# Patient Record
Sex: MALE | Race: WHITE | Employment: OTHER | URBAN - METROPOLITAN AREA
[De-identification: names, ages, dates, MRNs, and addresses within clinical notes are randomized per-mention and may not be internally consistent; named-entity substitution may affect disease eponyms.]

---

## 2018-03-14 ENCOUNTER — IP HISTORICAL/CONVERTED ENCOUNTER (OUTPATIENT)
Dept: OTHER | Age: 83
End: 2018-03-14

## 2018-03-23 ENCOUNTER — DOCUMENTATION ONLY (OUTPATIENT)
Dept: CARDIOLOGY CLINIC | Age: 83
End: 2018-03-23

## 2018-03-23 NOTE — PROGRESS NOTES
Dual chamber pacemaker implantation performed 3/16/18 at [facility]     Wound check 1 week   Clinic visit 4 weeks         Cardiac Electrophysiology Report       PATIENT INFORMATION     Patient Name: Angeles Daley MRN: [id number]                Study Date: 3/16/2018     YOB: 1934 Age: 80 years         Gender: Male     Procedure: Radha Black     Referring Physician:  Dr. Gregorio Vicente     Duty   Name   Electrophysiologist   Lora Salinas MD               PATIENT HISTORY     80year old male with history of CAD/CABG, diabetes mellitus, hypertension transferred from [facility] due to symptomatic bradycardia. He presented there with chest pain for which he was eavluated for ischemia. Echocardiogram demonstrated preserved LV function. He was however noted to have sinus bradycardia with second degree AV block with questionable wandering atrial pacemaker rhythm. He reports previous episodes of syncope as well as recent fatigue/dyspnea on exertion. He is not rate lowering medications currently and is thus referred for implantation of a dual chamber pacemaker. He was suspected to have a possible early left lower lobe pulmonary infiltrate has nearly completed a prophylactic course of levaquin. He is without fevers or leukocytosis currently. PROCEDURE     The patient was brought to the Cardiac Electrophysiology laboratory in a post-absorptive, fasting state.  Informed consent was obtained. A peripheral IV was in place.  Continuous electrocardiographic, blood pressure, O2 saturation and  CO2 monitoring was initiated.  Intravenous antibiotics were administered pre-operatively. Self-adhesive cardioversion patches were positioned on the chest.  Conscious sedation was effectuated according to protocol. The patient was then prepped and draped in the usual sterile fashion.  A left subclavian venogram was performed and demonstrated patency of the vein.  A 50/50 mixture of lidocaine (1%) with epinephrine and bupivicaine (0.5%) was utilized for local anesthesia. An incision was performed medial to the left delto-pectoral groove. Sharp and blunt dissection was carried down to the level of the pre-pectoralis fascia. Hemostasis was maintained with electrocautery. Extra-thoracic, subclavian venous access was obtained twice and sheaths were placed using the modified Seldinger technique. Permanent pace/sense leads were advanced under fluoroscopic guidance and positioned in the right atrium and ventricle where stable pacing and sensing characteristics were encountered. The sheaths were peeled away and the leads were anchored to the pre-pectoralis fascia using O-ethibond non-absorbable suture material. A device pocket was then fashioned and flushed copiously with antibiotic solution. A pacemaker generator was connected to the leads and the generator was placed into the pocket. The device was secured to the pocket using O-ethibond, non-absorbable suture material. The pocket was then closed in three layers using 2-O vicryl, 3-O monocryl, 4-O monocryl absorbable suture material and Dermabond. The skin was closed using a sub-cuticular technique. A bio-occlusive dressing were applied to the skin. The patient remained hemodynamically stable, tolerated the procedure well and was transferred in stable condition. There were no immediate complications encountered during the procedure.  There was minimal blood loss and no specimen were removed.         LEAD & GENERATOR DATA          Model #   Serial #   Generator   Medtronic   [id number]   [id number]   Atrial Lead   Medtronic   [id number]   [id number]   Ventricular Lead   Medtronic   [id number]   [id number]       PACE/SENSE DATA       Sensed Wave (mV)   Threshold (V)   Impedance (Ohms)   Atrium   1.6   1.0   532   Ventricle   12   0.75   532       FINAL PROGRAMMING     Mode   Lower Rate (ppm)   Upper Rate (ppm)   AAIR-DDDR   60   130       MEDICATION SUMMARY     Medication Route   Unit   Total   Gentamycin   IV   mg   80   Ancef   IV   grams   2                       RADIOLOGY SUMMARY       Total   Fluoro Time (minutes)    3.2     Dose Area Product (mGy)   47       CONCLUSIONS     1. Successful implantation of a dual-chamber pacemaker. 2. IV antibiotics x 24 hours for 3 doses followed by Keflex 500 mg po tid x 5 days. 3. Monitor overnight on telemetry. 4. CXR (PA & lateral) and device interrogation in AM.   5. Possible discharge in AM.   6. Wound check in EP clinic in 7 days. 7. Follow up in EP clinic in 1 month or earlier if necessary. 8. Follow up with Dr. Letitia Awan as scheduled.       Helen campbell, Dr. Lizz Goldsmith for involving me in the care of this extraordinarily pleasant male.        Alireza Thornton MD   Cardiac Electrophysiology     Signature Line   Electronically Signed on 03/20/2018 09:46 EDT   ________________________________________________   Wilda Rodriguez MD               Modified by: Wilda Rodriguez MD on 03/16/2018 11:41 EDT     Modified by: Wilda Rodriguez MD on 03/20/2018 09:46 EDT     Modified by: Wilda Rodriguez MD on 03/20/2018 09:46 EDT     Modified by: Wilda Rodriguez MD on 03/20/2018 09:46 EDT     Result type: Operative Report   Result date: March 16, 2018 11:22 EDT   Result status: Auth (Verified)   Performed by: Wilda Rodriguez MD on March 16, 2018 11:27 EDT   Verified by: Wilda Rodriguez MD on March 20, 2018 09:46 EDT   Encounter info: [id number], [address], Inpatient, 3/14/2018 - 3/17/2018   Mauricio Lua             Electronically signed by Giovanna Herrera LPN at 69/20/41 8140        Documentation Only on 3/21/2018              Detailed Report

## 2018-03-28 ENCOUNTER — OFFICE VISIT (OUTPATIENT)
Dept: CARDIOLOGY CLINIC | Age: 83
End: 2018-03-28

## 2018-03-28 VITALS
DIASTOLIC BLOOD PRESSURE: 62 MMHG | WEIGHT: 228.6 LBS | HEART RATE: 68 BPM | HEIGHT: 72 IN | OXYGEN SATURATION: 97 % | SYSTOLIC BLOOD PRESSURE: 110 MMHG | BODY MASS INDEX: 30.96 KG/M2 | RESPIRATION RATE: 16 BRPM

## 2018-03-28 DIAGNOSIS — Z51.89 VISIT FOR WOUND CHECK: ICD-10-CM

## 2018-03-28 DIAGNOSIS — Z95.0 PACEMAKER: Primary | ICD-10-CM

## 2018-03-28 RX ORDER — TRAZODONE HYDROCHLORIDE 150 MG/1
150 TABLET ORAL
COMMUNITY
Start: 2018-02-07

## 2018-03-28 RX ORDER — DIVALPROEX SODIUM 500 MG/1
500 TABLET, DELAYED RELEASE ORAL 2 TIMES DAILY
COMMUNITY
Start: 2018-02-07

## 2018-03-28 RX ORDER — INSULIN DETEMIR 100 [IU]/ML
30 INJECTION, SOLUTION SUBCUTANEOUS 2 TIMES DAILY
COMMUNITY
Start: 2018-03-08

## 2018-03-28 RX ORDER — LEVOTHYROXINE SODIUM 25 UG/1
25 TABLET ORAL
COMMUNITY
Start: 2018-01-15

## 2018-03-28 RX ORDER — ATORVASTATIN CALCIUM 40 MG/1
40 TABLET, FILM COATED ORAL DAILY
COMMUNITY
Start: 2018-02-07

## 2018-03-28 RX ORDER — AMLODIPINE BESYLATE 10 MG/1
5 TABLET ORAL DAILY
COMMUNITY
Start: 2018-01-28

## 2018-03-28 RX ORDER — LOSARTAN POTASSIUM 25 MG/1
25 TABLET ORAL DAILY
COMMUNITY
Start: 2018-02-22

## 2018-03-28 RX ORDER — ASPIRIN 81 MG/1
TABLET ORAL DAILY
COMMUNITY

## 2018-03-28 RX ORDER — CLOPIDOGREL BISULFATE 75 MG/1
TABLET ORAL
COMMUNITY

## 2018-03-28 RX ORDER — LORAZEPAM 0.5 MG/1
TABLET ORAL
COMMUNITY

## 2018-03-28 RX ORDER — METFORMIN HYDROCHLORIDE 850 MG/1
TABLET ORAL 2 TIMES DAILY WITH MEALS
COMMUNITY

## 2018-03-28 RX ORDER — GLIMEPIRIDE 2 MG/1
TABLET ORAL
COMMUNITY
End: 2020-01-01 | Stop reason: SDUPTHER

## 2018-03-28 NOTE — PROGRESS NOTES
Patient presents for wound check post-device implantation. The dressing was removed and the site was inspected. The site appeared to be well-healing without ecchymosis/tenderness/erythema. Denies pain, fevers, discharge. Plan:    Continue follow up in device clinic as planned.        Robby Rogers NP

## 2018-05-02 ENCOUNTER — CLINICAL SUPPORT (OUTPATIENT)
Dept: CARDIOLOGY CLINIC | Age: 83
End: 2018-05-02

## 2018-05-02 ENCOUNTER — OFFICE VISIT (OUTPATIENT)
Dept: CARDIOLOGY CLINIC | Age: 83
End: 2018-05-02

## 2018-05-02 VITALS
HEIGHT: 72 IN | RESPIRATION RATE: 18 BRPM | HEART RATE: 85 BPM | SYSTOLIC BLOOD PRESSURE: 114 MMHG | OXYGEN SATURATION: 96 % | WEIGHT: 227.6 LBS | BODY MASS INDEX: 30.83 KG/M2 | DIASTOLIC BLOOD PRESSURE: 64 MMHG

## 2018-05-02 DIAGNOSIS — Z95.0 PACEMAKER: ICD-10-CM

## 2018-05-02 DIAGNOSIS — I49.5 SSS (SICK SINUS SYNDROME) (HCC): ICD-10-CM

## 2018-05-02 DIAGNOSIS — I25.10 CORONARY ARTERY DISEASE INVOLVING NATIVE CORONARY ARTERY OF NATIVE HEART WITHOUT ANGINA PECTORIS: Primary | ICD-10-CM

## 2018-05-02 DIAGNOSIS — Z95.0 PACEMAKER: Primary | ICD-10-CM

## 2018-05-02 DIAGNOSIS — I10 ESSENTIAL HYPERTENSION: ICD-10-CM

## 2018-05-02 RX ORDER — PANTOPRAZOLE SODIUM 40 MG/1
40 TABLET, DELAYED RELEASE ORAL DAILY
COMMUNITY
End: 2020-01-01 | Stop reason: SDUPTHER

## 2018-05-02 NOTE — PROGRESS NOTES
HISTORY OF PRESENTING ILLNESS      Jesenia Joseph is a 80 y.o. male with history of CAD/CABG, diabetes mellitus, hypertension transferred from [facility] due to symptomatic bradycardia. He presented there with chest pain for which he was eavluated for ischemia. Echocardiogram demonstrated preserved LV function. He was however noted to have sinus bradycardia with second degree AV block with questionable wandering atrial pacemaker rhythm. He reports previous episodes of syncope as well as recent fatigue/dyspnea on exertion. He is not rate lowering medications currently and is thus referred for implantation of a dual chamber pacemaker. He was suspected to have a possible early left lower lobe pulmonary infiltrate has nearly completed a prophylactic course of levaquin. He is without fevers or leukocytosis currently. He underwent implantation of dual chamber pacemaker and now presents for follow up. Incision has healed well and device interrogation demonstrates normal functioning. ACTIVE PROBLEM LIST     There are no active problems to display for this patient. PAST MEDICAL HISTORY     No past medical history on file. PAST SURGICAL HISTORY     No past surgical history on file. ALLERGIES     No Known Allergies       FAMILY HISTORY     No family history on file. negative for cardiac disease       SOCIAL HISTORY     Social History     Social History    Marital status:      Spouse name: N/A    Number of children: N/A    Years of education: N/A     Social History Main Topics    Smoking status: Not on file    Smokeless tobacco: Not on file    Alcohol use Not on file    Drug use: Not on file    Sexual activity: Not on file     Other Topics Concern    Not on file     Social History Narrative         MEDICATIONS     Current Outpatient Prescriptions   Medication Sig    amLODIPine (NORVASC) 10 mg tablet Take 10 mg by mouth daily.     atorvastatin (LIPITOR) 40 mg tablet Take 40 mg by mouth daily.  aspirin delayed-release 81 mg tablet Take  by mouth daily.  clopidogrel (PLAVIX) 75 mg tab Take  by mouth.  divalproex DR (DEPAKOTE) 500 mg tablet Take 500 mg by mouth two (2) times a day.  LEVEMIR FLEXTOUCH U-100 INSULN 100 unit/mL (3 mL) inpn 20 Units by SubCUTAneous route nightly.  levothyroxine (SYNTHROID) 25 mcg tablet Take 25 mcg by mouth Daily (before breakfast).  traZODone (DESYREL) 150 mg tablet Take 150 mg by mouth nightly.  losartan (COZAAR) 25 mg tablet Take 25 mg by mouth daily.  ferrous sulfate 325 mg (65 mg iron) cpER Take  by mouth daily.  glimepiride (AMARYL) 2 mg tablet Take  by mouth every morning.  LORazepam (ATIVAN) 0.5 mg tablet Take  by mouth two (2) times daily as needed for Anxiety.  metFORMIN (GLUCOPHAGE) 850 mg tablet Take  by mouth two (2) times daily (with meals). No current facility-administered medications for this visit. I have reviewed the nurses notes, vitals, problem list, allergy list, medical history, family, social history and medications. REVIEW OF SYMPTOMS      General: Pt denies excessive weight gain or loss. Pt is able to conduct ADL's  HEENT: Denies blurred vision, headaches, hearing loss, epistaxis and difficulty swallowing. Respiratory: Denies cough, congestion, shortness of breath, WRIGHT, wheezing or stridor. Cardiovascular: Denies precordial pain, palpitations, edema or PND  Gastrointestinal: Denies poor appetite, indigestion, abdominal pain or blood in stool  Genitourinary: Denies hematuria, dysuria, increased urinary frequency  Musculoskeletal: Denies joint pain or swelling from muscles or joints  Neurologic: Denies tremor, paresthesias, headache, or sensory motor disturbance  Psychiatric: Denies confusion, insomnia, depression  Integumentray: Denies rash, itching or ulcers. Hematologic: Denies easy bruising, bleeding       PHYSICAL EXAMINATION      There were no vitals filed for this visit.   General: Well developed, in no acute distress. HEENT: No jaundice, oral mucosa moist, no oral ulcers  Neck: Supple, no stiffness, no lymphadenopathy, supple  Heart:  Normal S1/S2 negative S3 or S4. Regular, no murmur, gallop or rub, no jugular venous distention  Respiratory: Clear bilaterally x 4, no wheezing or rales  Abdomen:   Soft, non-tender, bowel sounds are active.   Extremities:  No edema, normal cap refill, no cyanosis. Musculoskeletal: No clubbing, no deformities  Neuro: A&Ox3, speech clear, gait stable, cooperative, no focal neurologic deficits  Skin: Skin color is normal. No rashes or lesions. Non diaphoretic, moist.  Vascular: 2+ pulses symmetric in all extremities       DIAGNOSTIC DATA      EKG: atrial pacing       LABORATORY DATA    No results found for: WBC, HGBPOC, HGB, HGBP, HCTPOC, HCT, PHCT, RBCH, PLT, MCV, HGBEXT, HCTEXT, PLTEXT   No results found for: NA, K, CL, CO2, AGAP, GLU, BUN, CREA, BUCR, GFRAA, GFRNA, CA, TBIL, TBILI, GPT, SGOT, AP, TP, ALB, GLOB, AGRAT, ALT        ASSESSMENT      1. Symptomatic Bradycardia   Dual Chamber MDT pacemaker  2. Hypertension  3. CAD  4. Diabetes Mellitus     PLAN     Continue per device clinic and with current medical therapy. FOLLOW-UP   1 year    Thank you, Dr. Morgan Romano for allowing me to participate in the care of this extraordinarily pleasant male. Please do not hesitate to contact me for further questions/concerns.      Rachael Harp, NP      [address] [facility] [address]  [address], [facility], [address]  [phone] / [phone] Fax   [phone] / [phone] Fax

## 2018-07-09 ENCOUNTER — TELEPHONE (OUTPATIENT)
Dept: CARDIOLOGY CLINIC | Age: 83
End: 2018-07-09

## 2018-07-09 NOTE — TELEPHONE ENCOUNTER
Pt step daughter Nicole Fontenot called requesting a sooner appt for pt due to frequent SVT. Nicole Fontenot can be reached at #[phone] or #[phone].   Thanks

## 2018-07-13 NOTE — TELEPHONE ENCOUNTER
Patients daughter called called again to get a sooner appt due to some concerns from the readings on his pacer   Phone: [phone]

## 2018-07-13 NOTE — TELEPHONE ENCOUNTER
Scheduled for 7/18 @ 3pm.  For Beata and pacer. Requested notes from Dr. Verna Boeck. Frequent episodes of SVT on pacer check. Medications adjusted per Dr. Verna Boeck on 7/5.

## 2018-07-18 ENCOUNTER — CLINICAL SUPPORT (OUTPATIENT)
Dept: CARDIOLOGY CLINIC | Age: 83
End: 2018-07-18

## 2018-07-18 ENCOUNTER — OFFICE VISIT (OUTPATIENT)
Dept: CARDIOLOGY CLINIC | Age: 83
End: 2018-07-18

## 2018-07-18 VITALS
DIASTOLIC BLOOD PRESSURE: 60 MMHG | HEART RATE: 65 BPM | RESPIRATION RATE: 24 BRPM | HEIGHT: 72 IN | SYSTOLIC BLOOD PRESSURE: 108 MMHG | OXYGEN SATURATION: 98 %

## 2018-07-18 DIAGNOSIS — Z95.0 CARDIAC PACEMAKER IN SITU: Primary | ICD-10-CM

## 2018-07-18 DIAGNOSIS — R06.02 SHORTNESS OF BREATH: ICD-10-CM

## 2018-07-18 DIAGNOSIS — Z95.0 PACEMAKER: Primary | ICD-10-CM

## 2018-07-18 RX ORDER — VALPROIC ACID 250 MG/1
500 CAPSULE, LIQUID FILLED ORAL 2 TIMES DAILY
COMMUNITY

## 2018-07-18 RX ORDER — METOPROLOL SUCCINATE 50 MG/1
TABLET, EXTENDED RELEASE ORAL DAILY
COMMUNITY

## 2018-07-18 NOTE — PROGRESS NOTES
HISTORY OF PRESENTING ILLNESS Dixon Monte is a 80 y.o. male with  history of CAD/CABG, diabetes mellitus, hypertension transferred from [facility] due to symptomatic bradycardia. He presented there with chest pain for which he was eavluated for ischemia. Echocardiogram demonstrated preserved LV function. He was however noted to have sinus bradycardia with second degree AV block with questionable wandering atrial pacemaker rhythm. He reports previous episodes of syncope as well as recent fatigue/dyspnea on exertion. He is not rate lowering medications currently and is thus referred for implantation of a dual chamber pacemaker. He was suspected to have a possible early left lower lobe pulmonary infiltrate. He presents today after being started on metoprolol for possible SVT. Device interrogation reveals normal functioning without recurrent SVT. He has been experiencing significant WRIGHT however reports this is episodic and chronic. Limited bedside echocardiogram today failed to demonstrate pericardial effusion. ACTIVE PROBLEM LIST There are no active problems to display for this patient. PAST MEDICAL HISTORY No past medical history on file. PAST SURGICAL HISTORY No past surgical history on file. ALLERGIES No Known Allergies FAMILY HISTORY No family history on file. negative for cardiac disease SOCIAL HISTORY Social History Social History  Marital status:  Spouse name: N/A  
 Number of children: N/A  
 Years of education: N/A Social History Main Topics  Smoking status: Former Smoker  Smokeless tobacco: Never Used  Alcohol use No  
 Drug use: None  Sexual activity: Not Asked Other Topics Concern  None Social History Narrative MEDICATIONS Current Outpatient Prescriptions Medication Sig  
 valproic acid (DEPAKENE) 250 mg capsule Take 500 mg by mouth two (2) times a day.   
 metoprolol succinate (TOPROL-XL) 50 mg XL tablet Take  by mouth daily.  pantoprazole (PROTONIX) 40 mg tablet Take 40 mg by mouth daily.  amLODIPine (NORVASC) 10 mg tablet Take 5 mg by mouth daily.  atorvastatin (LIPITOR) 40 mg tablet Take 40 mg by mouth daily.  aspirin delayed-release 81 mg tablet Take  by mouth daily.  clopidogrel (PLAVIX) 75 mg tab Take  by mouth.  LEVEMIR FLEXTOUCH U-100 INSULN 100 unit/mL (3 mL) inpn 30 Units by SubCUTAneous route two (2) times a day.  levothyroxine (SYNTHROID) 25 mcg tablet Take 25 mcg by mouth Daily (before breakfast).  traZODone (DESYREL) 150 mg tablet Take 150 mg by mouth nightly.  losartan (COZAAR) 25 mg tablet Take 25 mg by mouth daily.  ferrous sulfate 325 mg (65 mg iron) cpER Take  by mouth daily.  glimepiride (AMARYL) 2 mg tablet Take  by mouth every morning.  metFORMIN (GLUCOPHAGE) 850 mg tablet Take  by mouth two (2) times daily (with meals).  divalproex DR (DEPAKOTE) 500 mg tablet Take 500 mg by mouth two (2) times a day.  LORazepam (ATIVAN) 0.5 mg tablet Take  by mouth two (2) times daily as needed for Anxiety. No current facility-administered medications for this visit. I have reviewed the nurses notes, vitals, problem list, allergy list, medical history, family, social history and medications. REVIEW OF SYMPTOMS General: Pt denies excessive weight gain or loss. Pt is able to conduct ADL's HEENT: Denies blurred vision, headaches, hearing loss, epistaxis and difficulty swallowing. Respiratory: Denies cough, congestion, shortness of breath, WRIGHT, wheezing or stridor. Cardiovascular: Denies precordial pain, palpitations, edema or PND Gastrointestinal: Denies poor appetite, indigestion, abdominal pain or blood in stool Genitourinary: Denies hematuria, dysuria, increased urinary frequency Musculoskeletal: Denies joint pain or swelling from muscles or joints Neurologic: Denies tremor, paresthesias, headache, or sensory motor disturbance Psychiatric: Denies confusion, insomnia, depression Integumentray: Denies rash, itching or ulcers. Hematologic: Denies easy bruising, bleeding PHYSICAL EXAMINATION Vitals:  
 07/18/18 1520 BP: 108/60 Pulse: 65 Resp: 24 SpO2: 98% Height: 6' (1.829 m) General: Well developed, in no acute distress. HEENT: No jaundice, oral mucosa moist, no oral ulcers Neck: Supple, no stiffness, no lymphadenopathy, supple Heart:  Normal S1/S2 negative S3 or S4. Regular, no murmur, gallop or rub, no jugular venous distention Respiratory: Clear bilaterally x 4, no wheezing or rales Abdomen:   Soft, non-tender, bowel sounds are active.  
Extremities:  No edema, normal cap refill, no cyanosis. Musculoskeletal: No clubbing, no deformities Neuro: A&Ox3, speech clear, gait stable, cooperative, no focal neurologic deficits Skin: Skin color is normal. No rashes or lesions. Non diaphoretic, moist. 
Vascular: 2+ pulses symmetric in all extremities DIAGNOSTIC DATA EKG:  
 
 
 LABORATORY DATA No results found for: WBC, HGBPOC, HGB, HGBP, HCTPOC, HCT, PHCT, RBCH, PLT, MCV, HGBEXT, HCTEXT, PLTEXT No results found for: NA, K, CL, CO2, AGAP, GLU, BUN, CREA, BUCR, GFRAA, GFRNA, CA, TBIL, TBILI, GPT, SGOT, AP, TP, ALB, GLOB, AGRAT, ALT  
 
 
 ASSESSMENT 1. Symptomatic Bradycardia Dual Chamber MDT pacemaker 2. Hypertension 3. CAD 4. Diabetes Mellitus PLAN Continue current medications and device clinic follow up. No changes at this time. FOLLOW-UP  
 
1 year Thank you, Dr. Ping Fox for allowing me to participate in the care of this extraordinarily pleasant male. Please do not hesitate to contact me for further questions/concerns. Last Samaniego MD 
Cardiac Electrophysiology / Cardiology [address] [place] [address], [address]   [address] [facility], [address] [place], [facility] [address]    [place] [facility] 
[phone] / [phone] Fax   [phone] / [phone] Fax

## 2019-11-18 ENCOUNTER — OP HISTORICAL/CONVERTED ENCOUNTER (OUTPATIENT)
Dept: OTHER | Age: 84
End: 2019-11-18

## 2020-01-01 ENCOUNTER — TELEPHONE (OUTPATIENT)
Dept: PRIMARY CARE CLINIC | Age: 85
End: 2020-01-01

## 2020-01-01 ENCOUNTER — OFFICE VISIT (OUTPATIENT)
Dept: PRIMARY CARE CLINIC | Age: 85
End: 2020-01-01
Payer: MEDICARE

## 2020-01-01 ENCOUNTER — APPOINTMENT (OUTPATIENT)
Dept: GENERAL RADIOLOGY | Age: 85
End: 2020-01-01
Attending: EMERGENCY MEDICINE
Payer: MEDICARE

## 2020-01-01 ENCOUNTER — APPOINTMENT (OUTPATIENT)
Dept: CT IMAGING | Age: 85
End: 2020-01-01
Attending: EMERGENCY MEDICINE
Payer: MEDICARE

## 2020-01-01 ENCOUNTER — HOSPITAL ENCOUNTER (EMERGENCY)
Age: 85
Discharge: PSYCHIATRIC OR SUBSTANCE USE FACILITY | End: 2020-11-28
Attending: EMERGENCY MEDICINE
Payer: MEDICARE

## 2020-01-01 ENCOUNTER — OP HISTORICAL/CONVERTED ENCOUNTER (OUTPATIENT)
Dept: OTHER | Age: 85
End: 2020-01-01

## 2020-01-01 ENCOUNTER — HOSPITAL ENCOUNTER (EMERGENCY)
Age: 85
Discharge: HOME | End: 2020-10-15
Attending: EMERGENCY MEDICINE
Payer: MEDICARE

## 2020-01-01 ENCOUNTER — TELEPHONE (OUTPATIENT)
Dept: BEHAVIORAL/MENTAL HEALTH CLINIC | Age: 85
End: 2020-01-01

## 2020-01-01 VITALS
WEIGHT: 224 LBS | HEIGHT: 72 IN | BODY MASS INDEX: 30.34 KG/M2 | SYSTOLIC BLOOD PRESSURE: 135 MMHG | HEART RATE: 69 BPM | OXYGEN SATURATION: 98 % | TEMPERATURE: 97.3 F | DIASTOLIC BLOOD PRESSURE: 72 MMHG | RESPIRATION RATE: 20 BRPM

## 2020-01-01 VITALS
OXYGEN SATURATION: 99 % | TEMPERATURE: 98.9 F | HEART RATE: 73 BPM | SYSTOLIC BLOOD PRESSURE: 189 MMHG | DIASTOLIC BLOOD PRESSURE: 76 MMHG | RESPIRATION RATE: 16 BRPM

## 2020-01-01 VITALS
HEART RATE: 61 BPM | RESPIRATION RATE: 18 BRPM | DIASTOLIC BLOOD PRESSURE: 83 MMHG | TEMPERATURE: 96.8 F | OXYGEN SATURATION: 97 % | SYSTOLIC BLOOD PRESSURE: 172 MMHG

## 2020-01-01 DIAGNOSIS — Z23 NEEDS FLU SHOT: Primary | ICD-10-CM

## 2020-01-01 DIAGNOSIS — Z02.2 ENCOUNTER FOR EXAMINATION FOR ADMISSION TO NURSING HOME: Primary | ICD-10-CM

## 2020-01-01 DIAGNOSIS — F03.91 DEMENTIA WITH BEHAVIORAL DISTURBANCE, UNSPECIFIED DEMENTIA TYPE: Primary | ICD-10-CM

## 2020-01-01 DIAGNOSIS — E11.9 TYPE 2 DIABETES MELLITUS WITHOUT COMPLICATION, WITHOUT LONG-TERM CURRENT USE OF INSULIN (HCC): ICD-10-CM

## 2020-01-01 DIAGNOSIS — I25.10 CORONARY ARTERY DISEASE INVOLVING NATIVE CORONARY ARTERY OF NATIVE HEART WITHOUT ANGINA PECTORIS: ICD-10-CM

## 2020-01-01 DIAGNOSIS — F41.9 ANXIETY: ICD-10-CM

## 2020-01-01 DIAGNOSIS — S00.81XA ABRASION OF FACE, INITIAL ENCOUNTER: ICD-10-CM

## 2020-01-01 DIAGNOSIS — F03.91 DEMENTIA WITH BEHAVIORAL DISTURBANCE, UNSPECIFIED DEMENTIA TYPE: ICD-10-CM

## 2020-01-01 DIAGNOSIS — E11.9 CONTROLLED TYPE 2 DIABETES MELLITUS WITHOUT COMPLICATION, WITHOUT LONG-TERM CURRENT USE OF INSULIN (HCC): Primary | ICD-10-CM

## 2020-01-01 DIAGNOSIS — R52 PAIN: ICD-10-CM

## 2020-01-01 DIAGNOSIS — F03.911 AGITATION DUE TO DEMENTIA: ICD-10-CM

## 2020-01-01 DIAGNOSIS — K21.9 GASTROESOPHAGEAL REFLUX DISEASE WITHOUT ESOPHAGITIS: ICD-10-CM

## 2020-01-01 DIAGNOSIS — F99 INSOMNIA DUE TO OTHER MENTAL DISORDER: ICD-10-CM

## 2020-01-01 DIAGNOSIS — R05.9 COUGH IN ADULT: Primary | ICD-10-CM

## 2020-01-01 DIAGNOSIS — I25.10 CORONARY ARTERIOSCLEROSIS: ICD-10-CM

## 2020-01-01 DIAGNOSIS — I49.5 SICK SINUS SYNDROME (HCC): ICD-10-CM

## 2020-01-01 DIAGNOSIS — I10 ESSENTIAL HYPERTENSION: ICD-10-CM

## 2020-01-01 DIAGNOSIS — I50.9 CONGESTIVE HEART FAILURE, UNSPECIFIED HF CHRONICITY, UNSPECIFIED HEART FAILURE TYPE (HCC): ICD-10-CM

## 2020-01-01 DIAGNOSIS — Z51.81 MEDICATION MONITORING ENCOUNTER: Primary | ICD-10-CM

## 2020-01-01 DIAGNOSIS — F51.05 INSOMNIA DUE TO OTHER MENTAL DISORDER: ICD-10-CM

## 2020-01-01 DIAGNOSIS — S22.32XA CLOSED FRACTURE OF ONE RIB OF LEFT SIDE, INITIAL ENCOUNTER: ICD-10-CM
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
## 2020-01-01 DIAGNOSIS — R41.82 ALTERED MENTAL STATUS, UNSPECIFIED ALTERED MENTAL STATUS TYPE: Primary | ICD-10-CM

## 2020-01-01 LAB
ALBUMIN SERPL-MCNC: 3.5 G/DL (ref 3.5–5)
ALBUMIN SERPL-MCNC: 4.2 G/DL (ref 3.6–4.6)
ALBUMIN/CREAT UR: 42 MG/G CREAT (ref 0–29)
ALBUMIN/GLOB SERPL: 1 {RATIO} (ref 1.1–2.2)
ALBUMIN/GLOB SERPL: 2 {RATIO} (ref 1.2–2.2)
ALP SERPL-CCNC: 61 IU/L (ref 39–117)
ALP SERPL-CCNC: 71 U/L (ref 45–117)
ALT SERPL-CCNC: 16 U/L (ref 12–78)
ALT SERPL-CCNC: 9 IU/L (ref 0–44)
AMPHET UR QL SCN: NEGATIVE
ANION GAP SERPL CALC-SCNC: 13 MMOL/L (ref 5–15)
APAP SERPL-MCNC: 10 UG/ML (ref 10–30)
APPEARANCE UR: CLEAR
APPEARANCE UR: CLEAR
AST SERPL W P-5'-P-CCNC: 14 U/L (ref 15–37)
AST SERPL-CCNC: 13 IU/L (ref 0–40)
ATRIAL RATE: 238 BPM
BACTERIA #/AREA URNS HPF: ABNORMAL /[HPF]
BACTERIA URNS QL MICRO: NEGATIVE /HPF
BARBITURATES UR QL SCN: NEGATIVE
BASOPHILS # BLD AUTO: 0 X10E3/UL (ref 0–0.2)
BASOPHILS # BLD: 0 K/UL (ref 0–0.2)
BASOPHILS NFR BLD AUTO: 0 %
BASOPHILS NFR BLD: 1 % (ref 0–2.5)
BENZODIAZ UR QL: NEGATIVE
BILIRUB SERPL-MCNC: 0.4 MG/DL (ref 0–1.2)
BILIRUB SERPL-MCNC: 0.6 MG/DL (ref 0.2–1)
BILIRUB UR QL STRIP: NEGATIVE
BILIRUB UR QL: NEGATIVE
BUN SERPL-MCNC: 13 MG/DL (ref 6–20)
BUN SERPL-MCNC: 14 MG/DL (ref 8–27)
BUN/CREAT SERPL: 13 (ref 10–24)
BUN/CREAT SERPL: 14 (ref 12–20)
CA-I BLD-MCNC: 9.1 MG/DL (ref 8.5–10.1)
CALCIUM SERPL-MCNC: 9.4 MG/DL (ref 8.6–10.2)
CALCULATED R AXIS, ECG10: -72 DEGREES
CALCULATED T AXIS, ECG11: -160 DEGREES
CANNABINOIDS UR QL SCN: NEGATIVE
CASTS URNS MICRO: ABNORMAL
CASTS URNS QL MICRO: PRESENT /LPF
CHLORIDE SERPL-SCNC: 101 MMOL/L (ref 96–106)
CHLORIDE SERPL-SCNC: 103 MMOL/L (ref 97–108)
CO2 SERPL-SCNC: 26 MMOL/L (ref 20–29)
CO2 SERPL-SCNC: 27 MMOL/L (ref 21–32)
COCAINE UR QL SCN: NEGATIVE
COLOR UR: ABNORMAL
COLOR UR: YELLOW
CREAT SERPL-MCNC: 0.96 MG/DL (ref 0.7–1.3)
CREAT SERPL-MCNC: 1.1 MG/DL (ref 0.76–1.27)
CREAT UR-MCNC: 39.8 MG/DL
DATE LAST DOSE: ABNORMAL
DATE LAST DOSE: NORMAL
DIAGNOSIS, 93000: NORMAL
DRUG SCRN COMMENT,DRGCM: NORMAL
ECSTASY, ECST: NEGATIVE
EOSINOPHIL # BLD AUTO: 0.2 X10E3/UL (ref 0–0.4)
EOSINOPHIL # BLD: 0.1 K/UL (ref 0–0.7)
EOSINOPHIL NFR BLD AUTO: 2 %
EOSINOPHIL NFR BLD: 1 % (ref 0.9–2.9)
EPI CELLS #/AREA URNS HPF: ABNORMAL /HPF (ref 0–10)
EPITH CASTS URNS QL MICRO: NORMAL /LPF
ERYTHROCYTE [DISTWIDTH] IN BLOOD BY AUTOMATED COUNT: 13.5 % (ref 11.5–14.5)
ERYTHROCYTE [DISTWIDTH] IN BLOOD BY AUTOMATED COUNT: 13.7 % (ref 11.6–15.4)
EST. AVERAGE GLUCOSE BLD GHB EST-MCNC: 140 MG/DL
ETHANOL SERPL-MCNC: <4 MG/DL
GLOBULIN SER CALC-MCNC: 2.1 G/DL (ref 1.5–4.5)
GLOBULIN SER CALC-MCNC: 3.5 G/DL (ref 2–4)
GLUCOSE BLD STRIP.AUTO-MCNC: 245 MG/DL (ref 65–100)
GLUCOSE SERPL-MCNC: 114 MG/DL (ref 65–100)
GLUCOSE SERPL-MCNC: 149 MG/DL (ref 65–99)
GLUCOSE UR QL: ABNORMAL
GLUCOSE UR STRIP.AUTO-MCNC: NEGATIVE MG/DL
HBA1C MFR BLD: 6.5 % (ref 4.8–5.6)
HCT VFR BLD AUTO: 41.5 % (ref 41–53)
HCT VFR BLD AUTO: 41.6 % (ref 37.5–51)
HGB BLD-MCNC: 13.7 G/DL (ref 13.5–17.5)
HGB BLD-MCNC: 14 G/DL (ref 13–17.7)
HGB UR QL STRIP: NEGATIVE
HGB UR QL STRIP: NEGATIVE
IMM GRANULOCYTES # BLD AUTO: 0 X10E3/UL (ref 0–0.1)
IMM GRANULOCYTES NFR BLD AUTO: 1 %
KETONES UR QL STRIP.AUTO: ABNORMAL MG/DL
KETONES UR QL STRIP: NEGATIVE
LEUKOCYTE ESTERASE UR QL STRIP.AUTO: ABNORMAL
LEUKOCYTE ESTERASE UR QL STRIP: ABNORMAL
LYMPHOCYTES # BLD AUTO: 2.5 X10E3/UL (ref 0.7–3.1)
LYMPHOCYTES # BLD: 1.4 K/UL (ref 1–4.8)
LYMPHOCYTES NFR BLD AUTO: 29 %
LYMPHOCYTES NFR BLD: 21 % (ref 20.5–51.1)
MAGNESIUM SERPL-MCNC: 1.8 MG/DL (ref 1.6–2.4)
MCH RBC QN AUTO: 29.7 PG (ref 31–34)
MCH RBC QN AUTO: 30.5 PG (ref 26.6–33)
MCHC RBC AUTO-ENTMCNC: 32.9 G/DL (ref 31–36)
MCHC RBC AUTO-ENTMCNC: 33.7 G/DL (ref 31.5–35.7)
MCV RBC AUTO: 90.3 FL (ref 80–100)
MCV RBC AUTO: 91 FL (ref 79–97)
METHADONE UR QL: NEGATIVE
MICRO URNS: ABNORMAL
MICROALBUMIN UR-MCNC: 16.6 UG/ML
MONOCYTES # BLD AUTO: 1 X10E3/UL (ref 0.1–0.9)
MONOCYTES # BLD: 0.9 K/UL (ref 0.2–2.4)
MONOCYTES NFR BLD AUTO: 12 %
MONOCYTES NFR BLD: 13 % (ref 1.7–9.3)
MORPHOLOGY BLD-IMP: ABNORMAL
MUCOUS THREADS URNS QL MICRO: PRESENT
NEUTROPHILS # BLD AUTO: 4.7 X10E3/UL (ref 1.4–7)
NEUTROPHILS NFR BLD AUTO: 56 %
NEUTS SEG # BLD: 4.4 K/UL (ref 1.8–7.7)
NEUTS SEG NFR BLD: 64 % (ref 42–75)
NITRITE UR QL STRIP.AUTO: NEGATIVE
NITRITE UR QL STRIP: NEGATIVE
NRBC # BLD: 0 K/UL
NRBC BLD-RTO: 0 PER 100 WBC
OPIATES UR QL: NEGATIVE
P-R INTERVAL, ECG05: 212 MS
PCP UR QL: NEGATIVE
PERFORMED BY, TECHID: ABNORMAL
PH UR STRIP: 6.5 [PH] (ref 5–7.5)
PH UR STRIP: 6.5 [PH] (ref 5–8)
PLATELET # BLD AUTO: 107 K/UL
PLATELET # BLD AUTO: 115 X10E3/UL (ref 150–450)
PMV BLD AUTO: 12 FL (ref 6.5–11.5)
POTASSIUM SERPL-SCNC: 3.8 MMOL/L (ref 3.5–5.1)
POTASSIUM SERPL-SCNC: 5.1 MMOL/L (ref 3.5–5.2)
PROT SERPL-MCNC: 6.3 G/DL (ref 6–8.5)
PROT SERPL-MCNC: 7 G/DL (ref 6.4–8.2)
PROT UR QL STRIP: NEGATIVE
PROT UR STRIP-MCNC: NEGATIVE MG/DL
Q-T INTERVAL, ECG07: 503 MS
QRS DURATION, ECG06: 172 MS
QTC CALCULATION (BEZET), ECG08: 516 MS
RBC # BLD AUTO: 4.59 X10E6/UL (ref 4.14–5.8)
RBC # BLD AUTO: 4.6 M/UL (ref 4.5–5.9)
RBC #/AREA URNS HPF: ABNORMAL /HPF (ref 0–2)
RBC #/AREA URNS HPF: NORMAL /HPF (ref 0–3)
REPORTED DOSE,DOSE: ABNORMAL UNITS
REPORTED DOSE,DOSE: NORMAL UNITS
SALICYLATES SERPL-MCNC: <1.7 MG/DL (ref 2.8–20)
SARS-COV-2, COV2: NORMAL
SARS-COV-2, COV2: NOT DETECTED
SODIUM SERPL-SCNC: 140 MMOL/L (ref 134–144)
SODIUM SERPL-SCNC: 143 MMOL/L (ref 136–145)
SP GR UR REFRACTOMETRY: 1.02 (ref 1–1.03)
SP GR UR: 1.01 (ref 1–1.03)
SPECIMEN SOURCE: NORMAL
TROPONIN I SERPL-MCNC: <0.05 NG/ML
TROPONIN I SERPL-MCNC: <0.05 NG/ML
TSH SERPL DL<=0.05 MIU/L-ACNC: 2.5 UIU/ML (ref 0.36–3.74)
UROBILINOGEN UR QL STRIP.AUTO: 4 EU/DL (ref 0.2–1)
UROBILINOGEN UR STRIP-MCNC: 0.2 MG/DL (ref 0.2–1)
VENTRICULAR RATE, ECG03: 63 BPM
WBC # BLD AUTO: 6.8 K/UL (ref 4.4–11.3)
WBC # BLD AUTO: 8.5 X10E3/UL (ref 3.4–10.8)
WBC #/AREA URNS HPF: ABNORMAL /HPF (ref 0–5)
WBC URNS QL MICRO: NORMAL /HPF (ref 0–5)

## 2020-01-01 PROCEDURE — 74011250637 HC RX REV CODE- 250/637: Performed by: EMERGENCY MEDICINE

## 2020-01-01 PROCEDURE — 96372 THER/PROPH/DIAG INJ SC/IM: CPT

## 2020-01-01 PROCEDURE — 83735 ASSAY OF MAGNESIUM: CPT

## 2020-01-01 PROCEDURE — G8432 DEP SCR NOT DOC, RNG: HCPCS | Performed by: NURSE PRACTITIONER

## 2020-01-01 PROCEDURE — 74011250636 HC RX REV CODE- 250/636: Performed by: EMERGENCY MEDICINE

## 2020-01-01 PROCEDURE — 87635 SARS-COV-2 COVID-19 AMP PRB: CPT

## 2020-01-01 PROCEDURE — 85025 COMPLETE CBC W/AUTO DIFF WBC: CPT

## 2020-01-01 PROCEDURE — 71101 X-RAY EXAM UNILAT RIBS/CHEST: CPT

## 2020-01-01 PROCEDURE — 81001 URINALYSIS AUTO W/SCOPE: CPT

## 2020-01-01 PROCEDURE — 84443 ASSAY THYROID STIM HORMONE: CPT

## 2020-01-01 PROCEDURE — 74011250637 HC RX REV CODE- 250/637

## 2020-01-01 PROCEDURE — 99284 EMERGENCY DEPT VISIT MOD MDM: CPT

## 2020-01-01 PROCEDURE — 71045 X-RAY EXAM CHEST 1 VIEW: CPT

## 2020-01-01 PROCEDURE — 99285 EMERGENCY DEPT VISIT HI MDM: CPT

## 2020-01-01 PROCEDURE — 93005 ELECTROCARDIOGRAM TRACING: CPT

## 2020-01-01 PROCEDURE — G8536 NO DOC ELDER MAL SCRN: HCPCS | Performed by: NURSE PRACTITIONER

## 2020-01-01 PROCEDURE — 36415 COLL VENOUS BLD VENIPUNCTURE: CPT

## 2020-01-01 PROCEDURE — 80307 DRUG TEST PRSMV CHEM ANLYZR: CPT

## 2020-01-01 PROCEDURE — G8417 CALC BMI ABV UP PARAM F/U: HCPCS | Performed by: NURSE PRACTITIONER

## 2020-01-01 PROCEDURE — 90686 IIV4 VACC NO PRSV 0.5 ML IM: CPT | Performed by: FAMILY MEDICINE

## 2020-01-01 PROCEDURE — G0008 ADMIN INFLUENZA VIRUS VAC: HCPCS | Performed by: FAMILY MEDICINE

## 2020-01-01 PROCEDURE — 80053 COMPREHEN METABOLIC PANEL: CPT

## 2020-01-01 PROCEDURE — 82962 GLUCOSE BLOOD TEST: CPT

## 2020-01-01 PROCEDURE — 70450 CT HEAD/BRAIN W/O DYE: CPT

## 2020-01-01 PROCEDURE — G8427 DOCREV CUR MEDS BY ELIG CLIN: HCPCS | Performed by: NURSE PRACTITIONER

## 2020-01-01 PROCEDURE — 84484 ASSAY OF TROPONIN QUANT: CPT

## 2020-01-01 PROCEDURE — 99214 OFFICE O/P EST MOD 30 MIN: CPT | Performed by: NURSE PRACTITIONER

## 2020-01-01 RX ORDER — ATORVASTATIN CALCIUM 40 MG/1
TABLET, FILM COATED ORAL DAILY
COMMUNITY
Start: 2020-01-01

## 2020-01-01 RX ORDER — METFORMIN HYDROCHLORIDE 850 MG/1
850 TABLET ORAL 2 TIMES DAILY WITH MEALS
COMMUNITY
Start: 2020-01-01

## 2020-01-01 RX ORDER — ACETAMINOPHEN 325 MG/1
650 TABLET ORAL
Status: COMPLETED | OUTPATIENT
Start: 2020-01-01 | End: 2020-01-01

## 2020-01-01 RX ORDER — HALOPERIDOL 5 MG/1
5 TABLET ORAL DAILY
Status: DISCONTINUED | OUTPATIENT
Start: 2020-01-01 | End: 2020-01-01 | Stop reason: HOSPADM

## 2020-01-01 RX ORDER — LANCETS
EACH MISCELLANEOUS
COMMUNITY

## 2020-01-01 RX ORDER — TRAZODONE HYDROCHLORIDE 150 MG/1
150 TABLET ORAL
COMMUNITY

## 2020-01-01 RX ORDER — LORAZEPAM 1 MG/1
1 TABLET ORAL
Status: ACTIVE | OUTPATIENT
Start: 2020-01-01 | End: 2020-01-01

## 2020-01-01 RX ORDER — LORAZEPAM 2 MG/ML
2 INJECTION INTRAMUSCULAR ONCE
Status: COMPLETED | OUTPATIENT
Start: 2020-01-01 | End: 2020-01-01

## 2020-01-01 RX ORDER — INSULIN DETEMIR 100 [IU]/ML
20 INJECTION, SOLUTION SUBCUTANEOUS
COMMUNITY
Start: 2020-01-01

## 2020-01-01 RX ORDER — DIVALPROEX SODIUM 500 MG/1
TABLET, DELAYED RELEASE ORAL
Qty: 180 TAB | Refills: 0 | Status: SHIPPED | OUTPATIENT
Start: 2020-01-01

## 2020-01-01 RX ORDER — TRAZODONE HYDROCHLORIDE 50 MG/1
150 TABLET ORAL
Status: COMPLETED | OUTPATIENT
Start: 2020-01-01 | End: 2020-01-01

## 2020-01-01 RX ORDER — LEVOTHYROXINE SODIUM 25 UG/1
25 TABLET ORAL DAILY
COMMUNITY
Start: 2020-01-01

## 2020-01-01 RX ORDER — TRAZODONE HYDROCHLORIDE 50 MG/1
TABLET ORAL
Status: COMPLETED
Start: 2020-01-01 | End: 2020-01-01

## 2020-01-01 RX ORDER — PANTOPRAZOLE SODIUM 40 MG/1
40 TABLET, DELAYED RELEASE ORAL DAILY
Qty: 90 TAB | Refills: 0 | Status: SHIPPED | OUTPATIENT
Start: 2020-01-01

## 2020-01-01 RX ORDER — GUAIFENESIN DEXTROMETHORPHAN HYDROBROMIDE ORAL SOLUTION 10; 100 MG/5ML; MG/5ML
10 SOLUTION ORAL
Qty: 1 BOTTLE | Refills: 5 | Status: SHIPPED | OUTPATIENT
Start: 2020-01-01

## 2020-01-01 RX ORDER — GLIMEPIRIDE 2 MG/1
2 TABLET ORAL DAILY
COMMUNITY
Start: 2020-01-01

## 2020-01-01 RX ORDER — PEN NEEDLE, DIABETIC 31 GX5/16"
NEEDLE, DISPOSABLE MISCELLANEOUS
COMMUNITY
Start: 2020-01-01

## 2020-01-01 RX ORDER — METOPROLOL SUCCINATE 100 MG/1
1 TABLET, EXTENDED RELEASE ORAL DAILY
COMMUNITY
Start: 2020-01-01

## 2020-01-01 RX ORDER — LOSARTAN POTASSIUM 25 MG/1
TABLET ORAL DAILY
COMMUNITY
End: 2020-01-01 | Stop reason: SDUPTHER

## 2020-01-01 RX ORDER — FUROSEMIDE 20 MG/1
20 TABLET ORAL DAILY
COMMUNITY
Start: 2020-01-01

## 2020-01-01 RX ORDER — LOSARTAN POTASSIUM 25 MG/1
25 TABLET ORAL
Status: COMPLETED | OUTPATIENT
Start: 2020-01-01 | End: 2020-01-01

## 2020-01-01 RX ORDER — LORAZEPAM 0.5 MG/1
1 TABLET ORAL
COMMUNITY

## 2020-01-01 RX ORDER — DIVALPROEX SODIUM 500 MG/1
500 TABLET, DELAYED RELEASE ORAL
Status: COMPLETED | OUTPATIENT
Start: 2020-01-01 | End: 2020-01-01

## 2020-01-01 RX ORDER — PEN NEEDLE, DIABETIC 30 GX3/16"
NEEDLE, DISPOSABLE MISCELLANEOUS
COMMUNITY

## 2020-01-01 RX ORDER — HALOPERIDOL 5 MG/ML
5 INJECTION INTRAMUSCULAR
Status: COMPLETED | OUTPATIENT
Start: 2020-01-01 | End: 2020-01-01

## 2020-01-01 RX ORDER — LORAZEPAM 2 MG/1
2 TABLET ORAL
Status: COMPLETED | OUTPATIENT
Start: 2020-01-01 | End: 2020-01-01

## 2020-01-01 RX ORDER — LORAZEPAM 2 MG/ML
2 INJECTION INTRAMUSCULAR
Status: COMPLETED | OUTPATIENT
Start: 2020-01-01 | End: 2020-01-01

## 2020-01-01 RX ORDER — PANTOPRAZOLE SODIUM 40 MG/1
1 TABLET, DELAYED RELEASE ORAL DAILY
COMMUNITY
Start: 2020-01-01

## 2020-01-01 RX ORDER — BLOOD SUGAR DIAGNOSTIC
STRIP MISCELLANEOUS
COMMUNITY

## 2020-01-01 RX ORDER — ACETAMINOPHEN 500 MG
1000 TABLET ORAL
Status: COMPLETED | OUTPATIENT
Start: 2020-01-01 | End: 2020-01-01

## 2020-01-01 RX ORDER — DICYCLOMINE HYDROCHLORIDE 10 MG/1
10 CAPSULE ORAL DAILY
COMMUNITY
Start: 2020-01-01

## 2020-01-01 RX ORDER — LORAZEPAM 2 MG/ML
2 INJECTION INTRAMUSCULAR
Status: DISCONTINUED | OUTPATIENT
Start: 2020-01-01 | End: 2020-01-01

## 2020-01-01 RX ORDER — ASPIRIN 81 MG/1
TABLET ORAL
COMMUNITY

## 2020-01-01 RX ORDER — DIPHENHYDRAMINE HYDROCHLORIDE 50 MG/ML
50 INJECTION, SOLUTION INTRAMUSCULAR; INTRAVENOUS
Status: DISCONTINUED | OUTPATIENT
Start: 2020-01-01 | End: 2020-01-01

## 2020-01-01 RX ORDER — CALCIUM CARB/VITAMIN D3/VIT K1 500-100-40
TABLET,CHEWABLE ORAL
COMMUNITY

## 2020-01-01 RX ORDER — DIPHENHYDRAMINE HYDROCHLORIDE 50 MG/ML
50 INJECTION, SOLUTION INTRAMUSCULAR; INTRAVENOUS
Status: COMPLETED | OUTPATIENT
Start: 2020-01-01 | End: 2020-01-01

## 2020-01-01 RX ORDER — FUROSEMIDE 40 MG/1
20 TABLET ORAL
Status: COMPLETED | OUTPATIENT
Start: 2020-01-01 | End: 2020-01-01

## 2020-01-01 RX ORDER — ACETAMINOPHEN 325 MG/1
650 TABLET ORAL
Qty: 50 TAB | Refills: 4 | Status: SHIPPED | OUTPATIENT
Start: 2020-01-01

## 2020-01-01 RX ORDER — GLIMEPIRIDE 2 MG/1
2 TABLET ORAL
Qty: 90 TAB | Refills: 0 | Status: SHIPPED | OUTPATIENT
Start: 2020-01-01

## 2020-01-01 RX ORDER — KETOROLAC TROMETHAMINE 30 MG/ML
30 INJECTION, SOLUTION INTRAMUSCULAR; INTRAVENOUS
Status: COMPLETED | OUTPATIENT
Start: 2020-01-01 | End: 2020-01-01

## 2020-01-01 RX ORDER — LOSARTAN POTASSIUM 25 MG/1
TABLET ORAL
Qty: 90 TAB | Refills: 1 | Status: SHIPPED | OUTPATIENT
Start: 2020-01-01

## 2020-01-01 RX ADMIN — LORAZEPAM 2 MG: 2 INJECTION INTRAMUSCULAR; INTRAVENOUS at 16:55

## 2020-01-01 RX ADMIN — HALOPERIDOL 5 MG: 5 TABLET ORAL at 09:25

## 2020-01-01 RX ADMIN — KETOROLAC TROMETHAMINE 30 MG: 30 INJECTION, SOLUTION INTRAMUSCULAR at 20:49

## 2020-01-01 RX ADMIN — TRAZODONE HYDROCHLORIDE 150 MG: 50 TABLET ORAL at 02:26

## 2020-01-01 RX ADMIN — LORAZEPAM 2 MG: 2 INJECTION INTRAMUSCULAR; INTRAVENOUS at 11:50

## 2020-01-01 RX ADMIN — LORAZEPAM 2 MG: 2 TABLET ORAL at 18:43

## 2020-01-01 RX ADMIN — LORAZEPAM 2 MG: 2 INJECTION INTRAMUSCULAR; INTRAVENOUS at 14:56

## 2020-01-01 RX ADMIN — DIPHENHYDRAMINE HYDROCHLORIDE 50 MG: 50 INJECTION, SOLUTION INTRAMUSCULAR; INTRAVENOUS at 14:57

## 2020-01-01 RX ADMIN — DIVALPROEX SODIUM 500 MG: 500 TABLET, DELAYED RELEASE ORAL at 06:12

## 2020-01-01 RX ADMIN — HALOPERIDOL LACTATE 5 MG: 5 INJECTION, SOLUTION INTRAMUSCULAR at 14:55

## 2020-01-01 RX ADMIN — ACETAMINOPHEN 650 MG: 325 TABLET ORAL at 01:16

## 2020-01-01 RX ADMIN — LOSARTAN POTASSIUM 25 MG: 25 TABLET, FILM COATED ORAL at 00:08

## 2020-01-01 RX ADMIN — ACETAMINOPHEN 1000 MG: 500 TABLET ORAL at 08:36

## 2020-01-01 RX ADMIN — FUROSEMIDE 20 MG: 40 TABLET ORAL at 00:09

## 2020-01-01 RX ADMIN — HALOPERIDOL LACTATE 5 MG: 5 INJECTION, SOLUTION INTRAMUSCULAR at 20:18

## 2020-01-01 RX ADMIN — HALOPERIDOL 5 MG: 5 TABLET ORAL at 08:35

## 2020-08-17 NOTE — TELEPHONE ENCOUNTER
Have left 3 voice mails for pt's nurse manager to call office back re: the type of care pt. Needs and have not gotten a response. Will wait for her to reach out to us or discuss at pt's next OV.

## 2020-09-09 PROBLEM — F41.9 ANXIETY: Status: ACTIVE | Noted: 2020-01-01

## 2020-09-09 PROBLEM — I50.9 CHF (CONGESTIVE HEART FAILURE) (HCC): Status: ACTIVE | Noted: 2020-01-01

## 2020-09-09 PROBLEM — I25.10 CORONARY ARTERIOSCLEROSIS: Status: ACTIVE | Noted: 2020-01-01

## 2020-09-09 PROBLEM — E78.5 HYPERLIPIDEMIA: Status: ACTIVE | Noted: 2020-01-01

## 2020-09-09 PROBLEM — E11.9 DIABETES MELLITUS (HCC): Status: ACTIVE | Noted: 2020-01-01

## 2020-09-09 PROBLEM — M19.90 ARTHRITIS: Status: ACTIVE | Noted: 2020-01-01

## 2020-09-09 PROBLEM — I10 ESSENTIAL HYPERTENSION: Status: ACTIVE | Noted: 2020-01-01

## 2020-09-09 PROBLEM — K21.9 GASTROESOPHAGEAL REFLUX DISEASE: Status: ACTIVE | Noted: 2020-01-01

## 2020-09-09 PROBLEM — M48.00 SPINAL STENOSIS: Status: ACTIVE | Noted: 2020-01-01

## 2020-09-09 PROBLEM — R06.09 DYSPNEA ON EXERTION: Status: ACTIVE | Noted: 2020-01-01

## 2020-09-09 PROBLEM — F03.90 DEMENTIA (HCC): Status: ACTIVE | Noted: 2020-01-01

## 2020-09-16 NOTE — PROGRESS NOTES
Isidro Hernandez is a 80 y.o. male who presents to the office today for the following: Chief Complaint Patient presents with  Follow-up  
  [facility] Past Medical History:  
Diagnosis Date  Anemia  Anxiety 9/9/2020  CHF (congestive heart failure) ([facility] 9/9/2020  Dementia ([facility] 9/9/2020  Diabetes ([facility]  Hypercholesterolemia  Hypertension  Psychotic disorder ([facility]  Spinal stenosis 9/9/2020 History reviewed. No pertinent surgical history. Family History Problem Relation Age of Onset  No Known Problems Mother  No Known Problems Father Social History Tobacco Use  Smoking status: Former Smoker  Smokeless tobacco: Never Used Substance Use Topics  Alcohol use: Not on file  Drug use: Not on file HPI Patient here for evaluation for nursing home admission. He is normally seen by Dr. Nahum Mora to manage chronic conditions. Has h/o anxiety, dementia, type 2 diabetes, hypertension, GERD, Sick sinus syndrome, hypothyroidism and CHF that is controlled with medication. He is followed by cardiology at [facility]. Also evaluated by pulmonology this year for persistent sob but told no lung disease or further follow up required. Family present today states that he has been doing well and is at his baseline. Current Outpatient Medications on File Prior to Visit Medication Sig  LORazepam (ATIVAN) 0.5 mg tablet Take 1 Tab by mouth two (2) times daily as needed.  aspirin delayed-release 81 mg tablet aspirin 81 mg tablet,delayed release Take 1 tablet every day by oral route as directed.  atorvastatin (LIPITOR) 40 mg tablet Take  by mouth daily.  dicyclomine (BENTYL) 10 mg capsule Take 10 mg by mouth daily.  furosemide (LASIX) 20 mg tablet Take 20 mg by mouth daily.  glimepiride (AMARYL) 2 mg tablet Take 2 mg by mouth daily.  levothyroxine (SYNTHROID) 25 mcg tablet Take 25 mcg by mouth daily.  metFORMIN (GLUCOPHAGE) 850 mg tablet Take 850 mg by mouth two (2) times daily (with meals).  metoprolol succinate (TOPROL-XL) 100 mg tablet Take 1 Tab by mouth daily.  pantoprazole (PROTONIX) 40 mg tablet Take 1 Tab by mouth daily.  Droplet Pen Needle 31 gauge x 5/16\" ndle  Levemir FlexTouch U-100 Insuln 100 unit/mL (3 mL) inpn 20 Units by SubCUTAneous route Before breakfast and dinner.  glucose blood VI test strips (Accu-Chek Itzel Plus test strp) strip Accu-Chek Itzel Plus test strips  lancets (Accu-Chek Softclix Lancets) misc Accu-Chek Softclix Lancets  Insulin Syringe-Needle U-100 (BD Insulin Syringe Ultra-Fine) 0.3 mL 31 gauge x 5/16\" syrg BD Insulin Syringe Ultra-Fine 0.3 mL 31 gauge x 5/16\"  Insulin Needles, Disposable, (BD Ultra-Fine Short Pen Needle) 31 gauge x 5/16\" ndle BD Ultra-Fine Short Pen Needle 31 gauge x 5/16\"  divalproex DR (DEPAKOTE) 500 mg tablet Take 1 tablet by mouth twice daily No current facility-administered medications on file prior to visit. No orders of the defined types were placed in this encounter. Review of Systems Constitutional: Negative. HENT: Positive for hearing loss. Negative for congestion, ear pain and sore throat. Eyes: Negative. Negative for blurred vision, photophobia, pain, discharge and redness. Respiratory: Positive for shortness of breath. Negative for cough, hemoptysis, sputum production and wheezing. Cardiovascular: Positive for orthopnea. Negative for chest pain, palpitations, claudication and leg swelling. Gastrointestinal: Negative for abdominal pain, blood in stool, constipation, diarrhea, heartburn, nausea and vomiting. Genitourinary: Negative for dysuria, flank pain, frequency, hematuria and urgency. Musculoskeletal: Positive for joint pain and myalgias. Negative for back pain and neck pain. Skin: Negative for itching and rash. Neurological: Positive for weakness. Negative for dizziness, tingling, tremors, loss of consciousness and headaches. Psychiatric/Behavioral: Positive for memory loss. Negative for depression, substance abuse and suicidal ideas. The patient is nervous/anxious and has insomnia. Visit Vitals /72 (BP 1 Location: Left arm, BP Patient Position: Sitting) Pulse 69 Temp 97.3 °F (36.3 °C) (Tympanic) Resp 20 Ht 6' (1.829 m) Wt 224 lb (101.6 kg) SpO2 98% BMI 30.38 kg/m² Physical Exam 
Vitals signs and nursing note reviewed. Constitutional:   
   Appearance: Normal appearance. He is obese. HENT:  
   Head: Normocephalic and atraumatic. Right Ear: Decreased hearing noted. Left Ear: Decreased hearing noted. Nose: Nose normal.  
   Mouth/Throat:  
   Mouth: Mucous membranes are moist.  
   Pharynx: Oropharynx is clear. Eyes:  
   Pupils: Pupils are equal, round, and reactive to light. Cardiovascular:  
   Rate and Rhythm: Normal rate and regular rhythm. Pulses: Normal pulses. Heart sounds: Normal heart sounds. Pulmonary:  
   Effort: Pulmonary effort is normal.  
   Breath sounds: Normal breath sounds. Abdominal:  
   General: Abdomen is flat. Bowel sounds are normal.  
   Palpations: Abdomen is soft. Musculoskeletal: Normal range of motion. Skin: 
   General: Skin is warm and dry. Neurological:  
   Mental Status: He is alert. Mental status is at baseline. Gait: Gait abnormal.  
   Comments: cane with ambulation Psychiatric:     
   Mood and Affect: Mood is anxious. Behavior: Behavior is cooperative. Cognition and Memory: Memory is impaired. 1. Encounter for examination for admission to nursing home 2.  Type 2 diabetes mellitus without complication, without long-term current use of insulin ([facility]) 
 
- CBC WITH AUTOMATED DIFF 
- METABOLIC PANEL, COMPREHENSIVE 
- URINALYSIS W/ RFLX MICROSCOPIC 
 - HEMOGLOBIN A1C WITH EAG 
- MICROALBUMIN, UR, RAND W/ MICROALB/CREAT RATIO 3. Gastroesophageal reflux disease without esophagitis 4. Dementia with behavioral disturbance, unspecified dementia type ([facility] 5. Coronary arteriosclerosis 6. Essential hypertension 7. Congestive heart failure, unspecified HF chronicity, unspecified heart failure type ([facility] 8. Sick sinus syndrome ([facility] 9. Anxiety 10. Insomnia due to other mental disorder 11. Hypothyroidism Chronic conditions appear stable and he is functioning at his baseline per family who is with him Lonia Check) Reviewed most recent labs which were 9/2019 and these were stable. A1c was 6.9. Will update labs today. Compliance with medication reported and will continue all as directed Encourage check fasting sugar daily and notify provider if <70 or >300 Encourage yearly diabetic eye and foot exams Reviewed most recent cardiology note with [facility] and he will continue with his routine follow ups/care as scheduled TB risk assessment completed and PPD not necessary due to no risk factors Forms completed for nursing home admission and he is cleared for admission once he meets required 14 day quarantine and pending no change in condition. Patient verbalizes understanding of plan of care as discussed above Follow-up and Dispositions · Return in about 3 months (around 12/14/2020) for or sooner for worsening symptoms.

## 2020-10-15 NOTE — ED PROVIDER NOTES
HPI  
Patient resides at a local memory unit of a long-term care facility. He has a history of dementia and frequent falls. He apparently suffered a mechanical fall as he was found on the floor of his room with an abrasion above his left eyebrow. Has a vague memory of falling but cannot provide any details, and the circumstances and timing of the fall is unknown. Staff at his facility immediately summoned EMS. No report of other injury, trauma, or change from patient's baseline. Past Medical History:  
Diagnosis Date  Anemia  Anxiety 9/9/2020  CHF (congestive heart failure) ([facility] 9/9/2020  D[facility] 9/9/2020  Diabetes ([facility]  Hypercholesterolemia  Hypertension  Psychotic disorder ([facility]  Spinal stenosis 9/9/2020 History reviewed. No pertinent surgical history. Family History:  
Problem Relation Age of Onset  No Known Problems Mother  No Known Problems Father Social History Socioeconomic History  Marital status:  Spouse name: Not on file  Number of children: Not on file  Years of education: Not on file  Highest education level: Not on file Occupational History  Not on file Social Needs  Financial resource strain: Not on file  Food insecurity Worry: Not on file Inability: Not on file  Transportation needs Medical: Not on file Non-medical: Not on file Tobacco Use  Smoking status: Former Smoker  Smokeless tobacco: Never Used Substance and Sexual Activity  Alcohol use: Not on file  Drug use: Not on file  Sexual activity: Not on file Lifestyle  Physical activity Days per week: Not on file Minutes per session: Not on file  Stress: Not on file Relationships  Social connections Talks on phone: Not on file Gets together: Not on file Attends [employer] service: Not on file Active member of club or organization: Not on file Attends meetings of clubs or organizations: Not on file Relationship status: Not on file  Intimate partner violence Fear of current or ex partner: Not on file Emotionally abused: Not on file Physically abused: Not on file Forced sexual activity: Not on file Other Topics Concern  Not on file Social History Narrative  Not on file ALLERGIES: Morphine Review of Systems Unable to perform ROS: Dementia Vitals:  
 10/15/20 1849 BP: (!) 145/100 Pulse: 91  
Resp: 19 Temp: 96.8 °F (36 °C) SpO2: 98% Physical Exam 
Vitals signs and nursing note reviewed. Constitutional:   
   General: He is not in acute distress. Appearance: Normal appearance. He is not ill-appearing, toxic-appearing or diaphoretic. HENT:  
   Head: Normocephalic. Comments: There is a tiny superficial abrasion above the medial left eyebrow. Nose: Nose normal.  
   Mouth/Throat:  
   Mouth: Mucous membranes are moist.  
Eyes:  
   Extraocular Movements: Extraocular movements intact. Pupils: Pupils are equal, round, and reactive to light. Neck: Musculoskeletal: Normal range of motion and neck supple. No neck rigidity or muscular tenderness. Cardiovascular:  
   Rate and Rhythm: Normal rate and regular rhythm. Heart sounds: No murmur. Pulmonary:  
   Effort: Pulmonary effort is normal. No respiratory distress. Chest:  
   Chest wall: No tenderness. Abdominal:  
   General: Abdomen is flat. There is no distension. Palpations: There is no mass. Tenderness: There is no abdominal tenderness. There is no guarding or rebound. Hernia: No hernia is present. Musculoskeletal: Normal range of motion. General: No swelling, tenderness, deformity or signs of injury. Skin: 
   General: Skin is warm and dry. Neurological:  
   General: No focal deficit present. Mental Status: He is alert. He is disoriented. Cranial Nerves: No cranial nerve deficit. Sensory: No sensory deficit. Motor: No weakness. Coordination: Coordination normal.  
   Comments: Short-term memory loss. No concussive signs Psychiatric:     
   Mood and Affect: Mood normal.     
   Behavior: Behavior normal.  
 
  
 
MDM Patient with a tiny facial abrasion after mechanical fall. He is not anticoagulated and his exam is otherwise negative from a trauma standpoint. Okay for return to long-term care Procedures

## 2020-10-15 NOTE — ED NOTES
The patient's eye was bandaged with a 2 X 2 and tape. The skin is cleansed with warm soap and water. He is calm and cooperative at this time.

## 2020-10-15 NOTE — ED TRIAGE NOTES
Pt is a resident of the [address] [place], [place] [address] [place]. Pt fell in restroom, fall was unwitnessed. Pt is complain of pain in back of head and forehead. Pt oriented x 2but has HX of dementia

## 2020-10-19 NOTE — TELEPHONE ENCOUNTER
Informed Jermaine Coppolas that letter had been done and that she could pick it up. She stated she would come by office and get it today.
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
## 2020-11-20 NOTE — TELEPHONE ENCOUNTER
Do I formally send these to [address] or do we just give a verbal order for the PRN medications? I usually do not RX prn antibiotics as this can caused increased resistance to antibiotics.

## 2020-11-20 NOTE — TELEPHONE ENCOUNTER
Per Aram Schwab at the [facility], we can just fax them to her at [phone] and she will take care of them.

## 2020-11-20 NOTE — TELEPHONE ENCOUNTER
I spoke with pt's nurse Naresh Garcia. She is requesting some PRN meds for pt such as Tylenol as needed for pain, fever, headache. Robitussin PRN for cough. At the present time she stated he does not have any PRN meds ordered. He has a dry cough with some congestion, no fever, nausea, vomiting or diarrhea. She asked for the antibiotic just \"in case\". I informed her that usually you don't prescribe antibiotics for colds, they have to run their course. Farzana Camacho asked if I would ask you to answer this one.
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
## 2020-11-24 NOTE — ED NOTES
Assumed care of pt, pt up and ambulatory out of room with unsteady gait, high fall risk, Nursing Supervisor Valorie Mesa in ED and aware. Pt placed in wheelchair at nursing station for close supervision, awaiting update on staff available to sit.

## 2020-11-25 NOTE — ED NOTES
Pt now awake and cooperative with staff. States he is confused about where he is, pt reoriented to situation, place and time.

## 2020-11-25 NOTE — ED NOTES
Per D19, pt will need head CT, Chest xray and EKG in addition to labs already performed for placement. MD made aware, orders placed.

## 2020-11-25 NOTE — ED NOTES
Spoke to Domenico in Lab regarding COVID swab. COVID swab was obtained on previous shift and was sent out to [facility] this am at 0800. Results pending.

## 2020-11-25 NOTE — ED NOTES
Pt up in room and being uncooperative. Officers at bedside. Handcuffs moved from behind back to waist area with belt. Assisted to bed. Gait unsteady. Nursing Supervisor and ED Manager aware that pt needs to be 1:1 for safety and unable to provide sitter. NonSkid socks on.

## 2020-11-25 NOTE — ED NOTES
Pt up in room and unsteady on feet. Nursing Supervisor and ED Manager ntfd and attempting to find 1:1 sitter. Pt is High Fall risk. Nonslid yellow socks on.

## 2020-11-25 NOTE — ED NOTES
Able to reach staff at [facility] to confirm medication list. MD made aware of depakote dose, awaiting orders at this time.

## 2020-11-25 NOTE — ED NOTES
D19 Araceli called and wanted update on pt's status regarding behavior. Informed pt was up in room pacing. COVID results pending.

## 2020-11-25 NOTE — ED NOTES
Pt with visual hallucinations of his daughter, exhibiting aggressive behavior towards. MD made aware and pt medicated per MAR, tolerated well.

## 2020-11-25 NOTE — ED NOTES
Pt has been getting out of bed after attempting to reorient to surroundings. Pt uncooperative and fighting against staff and officers. Handcuffs remain in place. Pulses palpable. Full ROM. Pt fell onto floor from standing position without injury. NO LOC. No obvious signs of trauma.

## 2020-11-25 NOTE — ED NOTES
Pt agitated and up in room. Gait unsteady. Will not stay in bed. Officers at bedside. Handcuffs on pt. Bruising noted to bilateral hands where pt has been pulling at handcuffs and wriggling hands around. Will not follow commands. Pt fell onto floor without injury. Assisted back to bed with another nurse and officer. Attempted to reorient pt to surroundings. Snacks offered. Full ROM bilateral hands, Pulses palpable. Nursing Supervisor and ED Manager aware of need for 1:1. Unable to provide at this time. /94 HR 85 RR 18 Sat 96%

## 2020-11-25 NOTE — ED NOTES
ECO started @ 2030, Neftaly SMITH present with paperwork; Spoke with Jovanny Martini from D19 to update.

## 2020-11-25 NOTE — ED NOTES
Pt agitated and swinging at officers and staff. Handcuffs applied by 2 officers. Pt standing in room. Full ROM to bilateral wrists. Pulses palpable.

## 2020-11-26 NOTE — ED NOTES
Patient sleeping for the past hour or so. Family calling to inquire about the patient --talking with the local  for an update.

## 2020-11-26 NOTE — ED NOTES
Patient awake went into see the patient to assess his orientation- Patient oriented  To self but not oriented to  where his is or why he is here. Cooperative at this time

## 2020-11-26 NOTE — ED NOTES
Pt resting on stretcher in NAD, pt incontinent of urine, cleaned and provided with clean brief, gown and sheets. Pt also provided 2 cups of orange juice. Handcuffs removed, bruising noted to bilateral hands from pulling at handcuffs during previous shift.

## 2020-11-26 NOTE — ED NOTES
Assisted pt to use urinal in room. Officers at bedside. Pt unsteady on feet. Assisted back to bed once again. Pt remains in handcuffs.

## 2020-11-26 NOTE — ED NOTES
Incontinent of urine in bed. Urine soaked clothes removed. Depends and dry gown applied. Officers remain at bedside. Left elbow point has dried blood noted. No active bleeding noted. Pt had long-sleeve sweater on since arrival to ER yesterday. Bilateral siderails up, nonskid socks applied and officer at bedside. Nursing Supervisor and ED manager aware of need for sitter. Unavailable at this time.

## 2020-11-26 NOTE — ED NOTES
Spoke to Courtney at [id number] results. Per Dionicio specimen was in lab at [facility] and will be run on lab run in am. Requested specimen be priority due to pt under TDO.

## 2020-11-26 NOTE — ED NOTES
Sars test to be run at  17:00 today. The lab hopes to have result by 20:00 tonight. Family contacted the  ER and updated them about the [facility] testing. D19 has contacted us 3 times today for update and updated them about the [facility] test. 
D19 asked us to contact them once have the  [facility] result.

## 2020-11-27 NOTE — ED NOTES
Pt is upset and tearful, stating his wife is home alone and he is worried about her. PT helped back into bed and given a sandwich bag.

## 2020-11-27 NOTE — ED NOTES
The patient is assisted back into bed, he is up and trying to hold onto the furniture and move about the room.

## 2020-11-27 NOTE — ED NOTES
PT given orange juice and held conversation with staff. Very pleasant but tearful. Concerned about wife.

## 2020-11-27 NOTE — ED NOTES
Patient care and report received from [facility], [address] [facility]. The patient is lying supine on the bed with his legs dangling off of the bed. He is alert to person only. He is calm at this time.

## 2020-11-27 NOTE — ED NOTES
PT finished meal and was assisted back into bed. Some left sided rib cage pain noted. Dr. Lynette Milner notified.

## 2020-11-27 NOTE — ED NOTES
Patient care and report received from [facility], [address] [facility]

## 2020-11-27 NOTE — ED NOTES
T/C to D19, spoke with Binu, states she is speaking with a facility about the pt and will call back once she recieves any information

## 2020-11-27 NOTE — ED NOTES
Spoke w/ Garland Paiz and she had called [facility] lab about covid, informed that test has 106 minutes left before resulted.

## 2020-11-27 NOTE — ED NOTES
Staff contacted [id number] representative, and advised of a negative COVID 19 test.  Staff is to fax the hard copy of same to [phone].

## 2020-11-27 NOTE — ED NOTES
Per [employer] at [facility] was sent to alike by [employer]. [facility] pt and darshan will do a MD to MD phone call. MD from [facility] will call

## 2020-11-28 NOTE — ED NOTES
T/c from [facility]. Per [facility] has not received labs. Informed [facility] that labs were faxed but Kwadwo Zepeda will refax labs to [facility] at this time. Conformation of fax sent received

## 2020-11-28 NOTE — ED NOTES
Patient care and report given to Sam Yung RN. The patient is lying supine on the bed with his eyes closed and he appears to be resting.

## 2020-11-28 NOTE — ED NOTES
The patient's sitter is reassigned by Bhakti Talbot RN, [facility], there is no other available staff to sit 1:1 with the patient.

## 2020-11-28 NOTE — ED NOTES
Spoke with Sania Breen from [place] [id number], patient has been accepted to [facility] under the TDO. Patient accepted by Dr. Drew Funk to the admissions unit. Nurse-to-nurse can be called to [phone].

## 2020-11-28 NOTE — ED NOTES
The patient is lying supine on the bed with his eyes closed and he appears to be resting. NAD noted at this time.